# Patient Record
Sex: MALE | Race: WHITE | NOT HISPANIC OR LATINO | Employment: FULL TIME | URBAN - METROPOLITAN AREA
[De-identification: names, ages, dates, MRNs, and addresses within clinical notes are randomized per-mention and may not be internally consistent; named-entity substitution may affect disease eponyms.]

---

## 2024-02-07 ENCOUNTER — OFFICE VISIT (OUTPATIENT)
Dept: OPHTHALMOLOGY | Facility: CLINIC | Age: 64
End: 2024-02-07
Payer: MEDICAID

## 2024-02-07 DIAGNOSIS — D35.2 PITUITARY MACROADENOMA WITH EXTRASELLAR EXTENSION (MULTI): Primary | ICD-10-CM

## 2024-02-07 PROCEDURE — 92133 CPTRZD OPH DX IMG PST SGM ON: CPT | Performed by: PSYCHIATRY & NEUROLOGY

## 2024-02-07 PROCEDURE — 92083 EXTENDED VISUAL FIELD XM: CPT | Performed by: PSYCHIATRY & NEUROLOGY

## 2024-02-07 PROCEDURE — 99205 OFFICE O/P NEW HI 60 MIN: CPT | Performed by: PSYCHIATRY & NEUROLOGY

## 2024-02-07 RX ORDER — LEVOTHYROXINE SODIUM 125 UG/1
125 TABLET ORAL
COMMUNITY
Start: 2023-08-29 | End: 2024-08-28

## 2024-02-07 RX ORDER — CAPSAICIN 0.03 G/100G
CREAM TOPICAL
COMMUNITY
Start: 2023-05-30

## 2024-02-07 RX ORDER — BUPROPION HYDROCHLORIDE 150 MG/1
150 TABLET ORAL
COMMUNITY
Start: 2022-09-15

## 2024-02-07 RX ORDER — SODIUM SULFACETAMIDE AND SULFUR 80; 40 MG/ML; MG/ML
SOLUTION TOPICAL
COMMUNITY
Start: 2022-09-20

## 2024-02-07 RX ORDER — LORATADINE 10 MG/1
10 TABLET ORAL
COMMUNITY

## 2024-02-07 RX ORDER — PEN NEEDLE, DIABETIC 32GX 5/32"
NEEDLE, DISPOSABLE MISCELLANEOUS
COMMUNITY
Start: 2023-11-08

## 2024-02-07 RX ORDER — HYDROCORTISONE 5 MG/1
TABLET ORAL
COMMUNITY

## 2024-02-07 RX ORDER — TESTOSTERONE CYPIONATE 200 MG/ML
100 INJECTION, SOLUTION INTRAMUSCULAR
COMMUNITY
Start: 2022-04-05 | End: 2024-08-28

## 2024-02-07 RX ORDER — NEEDLES, DISPOSABLE 25GX5/8"
NEEDLE, DISPOSABLE MISCELLANEOUS
COMMUNITY
Start: 2024-01-08

## 2024-02-07 RX ORDER — SOMATROPIN 5 MG/1.5ML
1.5 INJECTION, SOLUTION SUBCUTANEOUS
COMMUNITY
Start: 2022-09-06

## 2024-02-07 RX ORDER — BUPROPION HYDROCHLORIDE 300 MG/1
300 TABLET ORAL
COMMUNITY
Start: 2024-01-25 | End: 2024-07-23

## 2024-02-07 RX ORDER — NEEDLES, SAFETY 22GX1 1/2"
NEEDLE, DISPOSABLE MISCELLANEOUS
COMMUNITY
Start: 2023-11-07

## 2024-02-07 RX ORDER — FERROUS SULFATE 325(65) MG
325 TABLET ORAL
COMMUNITY
Start: 2020-01-06 | End: 2024-08-23

## 2024-02-07 RX ORDER — ISOPROPYL ALCOHOL 70 ML/100ML
SWAB TOPICAL
COMMUNITY
Start: 2023-11-16

## 2024-02-07 RX ORDER — EPINEPHRINE 0.3 MG/.3ML
INJECTION SUBCUTANEOUS
COMMUNITY
Start: 2022-07-14

## 2024-02-07 RX ORDER — HYDROCORTISONE 10 MG/1
TABLET ORAL
COMMUNITY
Start: 2020-01-08

## 2024-02-07 RX ORDER — SYRINGE, DISPOSABLE, 1 ML
SYRINGE, EMPTY DISPOSABLE MISCELLANEOUS
COMMUNITY
Start: 2024-02-05

## 2024-02-07 RX ORDER — CLINDAMYCIN PHOSPHATE 10 UG/ML
LOTION TOPICAL 2 TIMES DAILY
COMMUNITY
Start: 2023-04-06

## 2024-02-07 ASSESSMENT — CUP TO DISC RATIO
OD_RATIO: 0.2
OS_RATIO: 0.2

## 2024-02-07 ASSESSMENT — EXTERNAL EXAM - RIGHT EYE: OD_EXAM: NORMAL

## 2024-02-07 ASSESSMENT — SLIT LAMP EXAM - LIDS
COMMENTS: NORMAL
COMMENTS: NORMAL

## 2024-02-07 ASSESSMENT — ENCOUNTER SYMPTOMS
PSYCHIATRIC NEGATIVE: 0
HEMATOLOGIC/LYMPHATIC NEGATIVE: 0
ALLERGIC/IMMUNOLOGIC NEGATIVE: 0
GASTROINTESTINAL NEGATIVE: 0
CONSTITUTIONAL NEGATIVE: 0
RESPIRATORY NEGATIVE: 0
CARDIOVASCULAR NEGATIVE: 0
MUSCULOSKELETAL NEGATIVE: 0
NEUROLOGICAL NEGATIVE: 0
ENDOCRINE NEGATIVE: 0
EYES NEGATIVE: 1

## 2024-02-07 ASSESSMENT — REFRACTION_WEARINGRX
OS_SPHERE: -4.75
OD_SPHERE: -4.50
OD_CYLINDER: -0.50
OD_AXIS: 076
OS_AXIS: 140
OS_CYLINDER: -0.25

## 2024-02-07 ASSESSMENT — TONOMETRY
OD_IOP_MMHG: 19
OS_IOP_MMHG: 19
IOP_METHOD: GOLDMANN APPLANATION

## 2024-02-07 ASSESSMENT — VISUAL ACUITY
OS_PH_CC: 20/25
OD_CC: 20/20
METHOD: SNELLEN - LINEAR
OS_CC+: +2
OS_CC: 20/40
CORRECTION_TYPE: CONTACTS

## 2024-02-07 ASSESSMENT — CONF VISUAL FIELD
OS_NORMAL: 1
OS_INFERIOR_NASAL_RESTRICTION: 0
OD_INFERIOR_TEMPORAL_RESTRICTION: 0
OS_INFERIOR_TEMPORAL_RESTRICTION: 0
OD_NORMAL: 1
OS_SUPERIOR_TEMPORAL_RESTRICTION: 0
OD_SUPERIOR_NASAL_RESTRICTION: 0
OD_INFERIOR_NASAL_RESTRICTION: 0
METHOD: COUNTING FINGERS
OD_SUPERIOR_TEMPORAL_RESTRICTION: 0
OS_SUPERIOR_NASAL_RESTRICTION: 0

## 2024-02-07 ASSESSMENT — EXTERNAL EXAM - LEFT EYE: OS_EXAM: NORMAL

## 2024-02-07 NOTE — PROGRESS NOTES
"Assessment and Plan      2/7/2024 OCT RNFL OD 95 & OS 94. ([facility])    2/7/2024 HVF 24-2 OD fovea 30, wnl MD -0.55 & OS fovea 39, wnl MD -1.77.    This 63 year-old man with a history of pituitary macroadenoma status post transsphenoidal resection 1990 & radiosurgery 2020 presents for evaluation of visual fields.    I see no evidence of optic nerve or chiasmal compression and no ocular misalignment regarding cavernous sinus extension. I recommend surveillance at this point. We reviewed return precautions of peripheral vision loss, diplopia and ptosis. Otherwise, I plan 9-12 month follow up.    We also discussed dry eye signs and artificial tears treatment.    Plan    Surveillance.    Follow up in 9-12 months with stereo plates, HVF & OCT. (Dilated 2/7/2024)  --- Addendum 2/13/2024 ---  12/01/2023 MRI brain with contrast, which I personally reviewed, shows a small enhancing mass in the right pituitary with right cavernous sinus extension and by report from [facility], shows \"Residual pituitary macroadenoma extending to the right cavernous sinus, not substantially changed since 3/14/2022. \"  Prior head imaging.  "

## 2024-02-07 NOTE — LETTER
"February 7, 2024     Sidney Rivera MD  [address] [place] [address] [place] [address]    Patient: Travis Diaz   YOB: 1960   Date of Visit: 2/7/2024     Dear Dr. Sidney Rivera MD:    I am writing to share my findings regarding our shared patient Travis Diaz from his visit with me on 2/7/2024.    HPI    This 63 year-old man with a history of pituitary macroadenoma status post transsphenoidal resection 1990 & radiosurgery 2020 presents for evaluation of visual fields.    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    He denies recent diplopia or change in her peripheral vision.    He sometimes notes some stars in his eyes and problems with lightheadedness if he stands quickly.    He has some floaters that now are less noticeable to him.    Dr. Sidney Rivera is his neurosurgeon at [facility].  Last edited by Shreyas Foley MD PhD on 2/7/2024  2:57 PM.        Diagnoses    Diagnoses and all orders for this visit:  Pituitary macroadenoma with extrasellar extension (CMS/HCC) (Primary)  -     OCT, Optic Nerve - OU - Both Eyes  -     Barrientos Visual Field - OU - Both Eyes    Assessment and Plan    12/01/2023 MRI brain with contrast, by report from [facility], shows \"Residual pituitary macroadenoma extending to the right cavernous sinus, not substantially changed since 3/14/2022. \"  Prior head imaging.    2/7/2024 OCT RNFL OD 95 & OS 94. ([facility])    2/7/2024 HVF 24-2 OD fovea 30, wnl MD -0.55 & OS fovea 39, wnl MD -1.77.    This 63 year-old man with a history of pituitary macroadenoma status post transsphenoidal resection 1990 & radiosurgery 2020 presents for evaluation of visual fields.    I see no evidence of optic nerve or chiasmal compression and no ocular misalignment regarding cavernous sinus extension. I recommend surveillance at this point. We reviewed return precautions of peripheral vision loss, diplopia and ptosis. Otherwise, I plan 9-12 month " follow up.    We also discussed dry eye signs and artificial tears treatment.    Plan    Surveillance.    Follow up in 9-12 months with stereo plates, HVF & OCT. (Dilated 2/7/2024)      Below you will find my full examination. I appreciate the opportunity to see Travis Diaz today and to share in his care with you. Please contact me if you have questions for me regarding this visit or if I can be of assistance to another of your patients with neuro-ophthalmological problems.    Sincerely,    Shreyas Foley MD PhD    CC:   No Recipients      Base Eye Exam       Visual Acuity (Snellen - Linear)         Right Left    Dist cc 20/20 20/40 +2    Dist ph cc  20/25      Correction: Contacts   MV CL             Tonometry ([facility]mann Applanation, 1:21 PM)         Right Left    Pressure 19 19              Pupils         Dark Light Shape React APD    Right 4 2 Round Brisk None    Left 4 2 Round Brisk None              Visual Fields (Counting fingers)         Left Right     Full Full              Extraocular Movement         Right Left     Full Full              Neuro/Psych       Oriented x3: Yes    Mood/Affect: Normal              Dilation       Both eyes: 1% Mydriacyl & 2.5% Mekhi  @ 3:00 PM                  Additional Tests       Color         Right Left    Ishihara 11/11 11/11                  Strabismus Exam       Method: Bolwell distance with correction      Distance Near Near +3DS N Bifocals                      0 0 0  Ortho  - - - - - -                      Ortho  0  0  Ortho  - -  - -  Ortho                      0 0 0  Ortho  - - - - - -                       Slit Lamp and Fundus Exam       External Exam         Right Left    External Normal Normal              Slit Lamp Exam         Right Left    Lids/Lashes Normal Normal    Conjunctiva/Sclera White and quiet White and quiet    Cornea inferior PEE inferior PEE    Anterior Chamber Deep and quiet Deep and quiet    Iris Round and reactive Round and reactive    Lens  Trace Nuclear sclerosis Trace Nuclear sclerosis    Anterior Vitreous Normal Posterior vitreous detachment              Fundus Exam         Right Left    Disc Normal Normal    C/D Ratio 0.2 0.2    Macula Normal Normal    Vessels Normal Normal    Periphery Normal Normal                  Refraction       Wearing Rx         Sphere Cylinder Axis    Right -4.50 -0.50 076    Left -4.75 -0.25 140

## 2024-11-07 ENCOUNTER — APPOINTMENT (OUTPATIENT)
Dept: OPHTHALMOLOGY | Facility: CLINIC | Age: 64
End: 2024-11-07
Payer: MEDICAID

## 2025-07-16 ENCOUNTER — APPOINTMENT (OUTPATIENT)
Dept: ORTHOPEDIC SURGERY | Facility: CLINIC | Age: 65
End: 2025-07-16
Payer: MEDICAID

## 2025-07-16 ENCOUNTER — HOSPITAL ENCOUNTER (OUTPATIENT)
Dept: RADIOLOGY | Facility: CLINIC | Age: 65
Discharge: HOME | End: 2025-07-16
Payer: COMMERCIAL

## 2025-07-16 VITALS — HEIGHT: 71 IN | BODY MASS INDEX: 27.72 KG/M2 | WEIGHT: 198 LBS

## 2025-07-16 DIAGNOSIS — M25.373 INSTABILITY OF ANKLE, UNSPECIFIED LATERALITY: ICD-10-CM

## 2025-07-16 DIAGNOSIS — M25.572 BILATERAL ANKLE PAIN, UNSPECIFIED CHRONICITY: ICD-10-CM

## 2025-07-16 DIAGNOSIS — M25.571 BILATERAL ANKLE PAIN, UNSPECIFIED CHRONICITY: ICD-10-CM

## 2025-07-16 DIAGNOSIS — M87.00 AVASCULAR NECROSIS OF BONE (MULTI): ICD-10-CM

## 2025-07-16 DIAGNOSIS — M25.879 ANKLE IMPINGEMENT SYNDROME, UNSPECIFIED LATERALITY: Primary | ICD-10-CM

## 2025-07-16 PROCEDURE — 73610 X-RAY EXAM OF ANKLE: CPT | Mod: BILATERAL PROCEDURE | Performed by: RADIOLOGY

## 2025-07-16 PROCEDURE — 1159F MED LIST DOCD IN RCRD: CPT | Performed by: ORTHOPAEDIC SURGERY

## 2025-07-16 PROCEDURE — 73610 X-RAY EXAM OF ANKLE: CPT | Mod: 50

## 2025-07-16 PROCEDURE — 3008F BODY MASS INDEX DOCD: CPT | Performed by: ORTHOPAEDIC SURGERY

## 2025-07-16 PROCEDURE — 99204 OFFICE O/P NEW MOD 45 MIN: CPT | Performed by: ORTHOPAEDIC SURGERY

## 2025-07-16 ASSESSMENT — PAIN SCALES - GENERAL: PAINLEVEL_OUTOF10: 5 - MODERATE PAIN

## 2025-07-16 ASSESSMENT — PAIN DESCRIPTION - DESCRIPTORS: DESCRIPTORS: SHARP

## 2025-07-16 ASSESSMENT — PAIN - FUNCTIONAL ASSESSMENT: PAIN_FUNCTIONAL_ASSESSMENT: 0-10

## 2025-07-16 NOTE — PROGRESS NOTES
64-year-old male here for bilateral ankle pain.  We did remote surgery on his left ankle for ligament reconstruction at [facility] many years ago.  Never had any surgery on his right side.  He played active basketball for many years.  He stopped due to his ankles back in February.  He had multiple sprains of his right ankle.  Left ankle mainly hurts since his activities.    On exam:  WD/WN athletic male  A+O X3  NAD  No lymphedema  Inspection of both feet and ankles show symmetric arches.   Point tender anterolateral gutter bilaterally.  Able to STR bilaterally.   Tender dorsal right talonavicular joint.  5/5 strength in all 4 planes.   Sensation intact to LT.   Good pulses.   Moderate right anterior drawer.  Good endpoint.  Stable left ankle anterior drawer.  No peroneal subluxation.    (-) Isael.     I personally reviewed the following radiographic exams: X-ray of left ankle shows postsurgical change from ligament reconstruction.  No OCD.  Right ankle shows well-maintained joint space.  Irregularity dorsal navicular with possible avascular fragment.    Assessment: Bilateral ankle impingement.  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    Plan: Discussed nonoperative and operative options in detail.   Risk and benefits discussed in detail. All questions answered today.  Recovery timeline and expectations discussed in detail.  Recommend MRI of right ankle to evaluate the lateral collaterals and navicular.  Will likely need weightbearing films of his right foot as well to look at the talonavicular joint.  Will follow-up after MRI discuss further options.